# Patient Record
Sex: MALE | Race: ASIAN | URBAN - METROPOLITAN AREA
[De-identification: names, ages, dates, MRNs, and addresses within clinical notes are randomized per-mention and may not be internally consistent; named-entity substitution may affect disease eponyms.]

---

## 2023-08-31 ENCOUNTER — OFFICE (OUTPATIENT)
Dept: URBAN - METROPOLITAN AREA CLINIC 67 | Facility: CLINIC | Age: 38
End: 2023-08-31
Payer: COMMERCIAL

## 2023-08-31 VITALS
HEART RATE: 82 BPM | DIASTOLIC BLOOD PRESSURE: 67 MMHG | SYSTOLIC BLOOD PRESSURE: 122 MMHG | OXYGEN SATURATION: 98 % | HEIGHT: 66 IN | WEIGHT: 157 LBS

## 2023-08-31 DIAGNOSIS — R14.0 ABDOMINAL DISTENSION (GASEOUS): ICD-10-CM

## 2023-08-31 PROCEDURE — 99203 OFFICE O/P NEW LOW 30 MIN: CPT | Performed by: INTERNAL MEDICINE

## 2023-08-31 NOTE — SERVICEHPINOTES
Celi Sandraoj Topete   is seen for an initial visit today.
br
br He reports that he has had persistent bloating for the past 6-8 months along with loud digestive noises, especially at night. He denies any abdominal pain per se and he has not experienced any changes in his usual bowel habits, GI tract bleeding symptoms, unexplained weight loss or loss of appetitie. His symptoms seem to be more pronounced when he eats dairy but he also experiences the bloating even when he has not recently had anything to eat.Karina is no known family history of CRC, polyps or celiac disease - he reports that a colonoscopy done 20yrs ago (while in [place]) was unremarkalble.

## 2023-08-31 NOTE — SERVICENOTES
I will check the above blood work as well as breath tests for SIBO and Sucrase deficiency. In the meantime, I suggested that he try taking OTC Lactaid when he eats anything that contains dairy to see if that helps with his bloating symptom.

## 2024-01-08 ENCOUNTER — OFFICE (OUTPATIENT)
Dept: URBAN - METROPOLITAN AREA CLINIC 84 | Facility: CLINIC | Age: 39
End: 2024-01-08
Payer: COMMERCIAL

## 2024-01-08 VITALS
DIASTOLIC BLOOD PRESSURE: 84 MMHG | WEIGHT: 157 LBS | HEART RATE: 72 BPM | HEIGHT: 66 IN | SYSTOLIC BLOOD PRESSURE: 122 MMHG

## 2024-01-08 DIAGNOSIS — R14.0 ABDOMINAL DISTENSION (GASEOUS): ICD-10-CM

## 2024-01-08 PROCEDURE — 99214 OFFICE O/P EST MOD 30 MIN: CPT | Performed by: INTERNAL MEDICINE

## 2024-01-08 RX ORDER — DICYCLOMINE HYDROCHLORIDE 20 MG/1
TABLET ORAL
Qty: 60 | Refills: 0 | Status: ACTIVE
Start: 2024-01-08

## 2024-01-08 NOTE — SERVICENOTES
I will place him on an empiric trial of Bentyl as an antispasmodic and have him call back in 4 weeks with an update.

## 2024-01-08 NOTE — SERVICEHPINOTES
Celi Topete   is seen today for a follow-up visit   regarding abdominal bloating. At the time of his last appointment in 8/2023, his tTG IgA Ab titer was normal/negative and a food allergy panel revealed moderate reactivity to shrimp and equivocal/low reactivity to several foods thought to be of no clinical significance.brA sucrase BT was normal but results of a SIBO BT supported underlying SIBO and he was treated with a 14 day course of Rifaximin.Today, he reports that his symptoms are not any different after completing the course of Rifaximin - he still endorses abdominal bloating and "gurgling noises" that he notices more at night. He denies any GI tract bleeding symptoms, emesis, changes in his usual bowel habits or unexplained weight loss. Interestingly, he feels like he recently had some improvement in his symptoms after taking a dose of Pantoprazole. br